# Patient Record
Sex: MALE | Race: WHITE
[De-identification: names, ages, dates, MRNs, and addresses within clinical notes are randomized per-mention and may not be internally consistent; named-entity substitution may affect disease eponyms.]

---

## 2019-01-16 VITALS — SYSTOLIC BLOOD PRESSURE: 137 MMHG | DIASTOLIC BLOOD PRESSURE: 73 MMHG

## 2019-01-16 LAB
BASOPHILS NFR BLD AUTO: 0.8 % (ref 0–5)
BUN SERPL-MCNC: 24 MG/DL (ref 7–18)
CHLORIDE SERPL-SCNC: 103 MMOL/L (ref 101–111)
CO2 SERPL-SCNC: 29 MMOL/L (ref 21–32)
CREAT SERPL-MCNC: 1.6 MG/DL (ref 0.5–1.5)
EOSINOPHIL NFR BLD AUTO: 1.7 % (ref 0–8)
ERYTHROCYTE [DISTWIDTH] IN BLOOD BY AUTOMATED COUNT: 15.2 % (ref 11–15.5)
GFR SERPL CREATININE-BSD FRML MDRD: 46 ML/MIN (ref 60–?)
GLUCOSE SERPL-MCNC: 113 MG/DL (ref 70–105)
HCT VFR BLD AUTO: 44.1 % (ref 42–54)
LYMPHOCYTES NFR SPEC AUTO: 23.9 % (ref 21–51)
MCH RBC QN AUTO: 30.6 PG (ref 27–33)
MCHC RBC AUTO-ENTMCNC: 32.9 G/DL (ref 32–36)
MCV RBC AUTO: 92.9 FL (ref 79–99)
MONOCYTES NFR BLD AUTO: 14.3 % (ref 3–13)
NEUTROPHILS NFR BLD AUTO: 59.3 % (ref 40–77)
NRBC BLD MANUAL-RTO: 0.1 % (ref 0–0.19)
PLATELET # BLD AUTO: 161 K/UL (ref 130–400)
POTASSIUM SERPL-SCNC: 3.1 MMOL/L (ref 3.5–5.1)
RBC # BLD AUTO: 4.75 MIL/UL (ref 4.5–6.2)
SODIUM SERPL-SCNC: 142 MMOL/L (ref 136–145)
WBC # BLD AUTO: 8.7 K/UL (ref 4.8–10.8)

## 2019-01-17 NOTE — NUR
ABNORMAL LABS 

REPORTED AND FAXED ABNORMAL LABS POTASSIUM 3.1- BMP TO DR. GRIFFIN OFFICE. SPOKE WITH 
LEIGH ANN. AS PER DR. APONTE NO NEW ORDERS.

## 2019-01-18 ENCOUNTER — HOSPITAL ENCOUNTER (OUTPATIENT)
Dept: HOSPITAL 90 - DAH | Age: 64
Discharge: HOME | End: 2019-01-18
Attending: UROLOGY
Payer: MEDICARE

## 2019-01-18 VITALS — SYSTOLIC BLOOD PRESSURE: 137 MMHG | DIASTOLIC BLOOD PRESSURE: 72 MMHG

## 2019-01-18 VITALS — DIASTOLIC BLOOD PRESSURE: 88 MMHG | SYSTOLIC BLOOD PRESSURE: 140 MMHG

## 2019-01-18 VITALS — DIASTOLIC BLOOD PRESSURE: 80 MMHG | SYSTOLIC BLOOD PRESSURE: 129 MMHG

## 2019-01-18 VITALS — SYSTOLIC BLOOD PRESSURE: 135 MMHG | DIASTOLIC BLOOD PRESSURE: 70 MMHG

## 2019-01-18 VITALS — DIASTOLIC BLOOD PRESSURE: 82 MMHG | SYSTOLIC BLOOD PRESSURE: 117 MMHG

## 2019-01-18 VITALS — SYSTOLIC BLOOD PRESSURE: 142 MMHG | DIASTOLIC BLOOD PRESSURE: 80 MMHG

## 2019-01-18 VITALS — SYSTOLIC BLOOD PRESSURE: 127 MMHG | DIASTOLIC BLOOD PRESSURE: 87 MMHG

## 2019-01-18 VITALS — DIASTOLIC BLOOD PRESSURE: 72 MMHG | SYSTOLIC BLOOD PRESSURE: 127 MMHG

## 2019-01-18 VITALS — SYSTOLIC BLOOD PRESSURE: 145 MMHG | DIASTOLIC BLOOD PRESSURE: 77 MMHG

## 2019-01-18 VITALS — DIASTOLIC BLOOD PRESSURE: 82 MMHG | SYSTOLIC BLOOD PRESSURE: 145 MMHG

## 2019-01-18 VITALS — SYSTOLIC BLOOD PRESSURE: 127 MMHG | DIASTOLIC BLOOD PRESSURE: 72 MMHG

## 2019-01-18 VITALS — BODY MASS INDEX: 44.24 KG/M2 | HEIGHT: 70 IN | WEIGHT: 309 LBS

## 2019-01-18 VITALS — SYSTOLIC BLOOD PRESSURE: 142 MMHG | DIASTOLIC BLOOD PRESSURE: 69 MMHG

## 2019-01-18 VITALS — DIASTOLIC BLOOD PRESSURE: 84 MMHG | SYSTOLIC BLOOD PRESSURE: 138 MMHG

## 2019-01-18 VITALS — DIASTOLIC BLOOD PRESSURE: 80 MMHG | SYSTOLIC BLOOD PRESSURE: 137 MMHG

## 2019-01-18 VITALS — SYSTOLIC BLOOD PRESSURE: 121 MMHG | DIASTOLIC BLOOD PRESSURE: 65 MMHG

## 2019-01-18 VITALS — SYSTOLIC BLOOD PRESSURE: 136 MMHG | DIASTOLIC BLOOD PRESSURE: 77 MMHG

## 2019-01-18 DIAGNOSIS — N21.0: ICD-10-CM

## 2019-01-18 DIAGNOSIS — G47.30: ICD-10-CM

## 2019-01-18 DIAGNOSIS — Z88.8: ICD-10-CM

## 2019-01-18 DIAGNOSIS — I10: ICD-10-CM

## 2019-01-18 DIAGNOSIS — Z79.899: ICD-10-CM

## 2019-01-18 DIAGNOSIS — Z98.890: ICD-10-CM

## 2019-01-18 DIAGNOSIS — N40.1: ICD-10-CM

## 2019-01-18 DIAGNOSIS — Z85.850: ICD-10-CM

## 2019-01-18 DIAGNOSIS — N13.2: Primary | ICD-10-CM

## 2019-01-18 PROCEDURE — 76000 FLUOROSCOPY <1 HR PHYS/QHP: CPT

## 2019-01-18 PROCEDURE — 36415 COLL VENOUS BLD VENIPUNCTURE: CPT

## 2019-01-18 PROCEDURE — 85025 COMPLETE CBC W/AUTO DIFF WBC: CPT

## 2019-01-18 PROCEDURE — 93005 ELECTROCARDIOGRAM TRACING: CPT

## 2019-01-18 PROCEDURE — 52317 REMOVE BLADDER STONE: CPT

## 2019-01-18 PROCEDURE — 52356 CYSTO/URETERO W/LITHOTRIPSY: CPT

## 2019-01-18 PROCEDURE — 80048 BASIC METABOLIC PNL TOTAL CA: CPT

## 2019-01-18 NOTE — NUR
DISCHARGE

ORAL AND WRITTEN DISCHARGE INSTRUCTIONS TO PT AND PTS WIFE ALONG WITH PRESCRIPTION. NO OTHER 
QUESTIONS AT THIS TIME.

## 2019-01-18 NOTE — NUR
VALUABLES:

CLOTHING, CANE, GLASSES, MEDICATIONS, WALLET AND CELL PHONE GIVEN TO WIFE: YAKELIN ARROYO. CPAP 
MACHINE SEND TO PACU

## 2019-01-18 NOTE — NUR
FC

16F FC DISCONTINUED USING ASEPTIC TECHNIQUE. 700ML OF RED CLEAR URINE IN FC. PT TOLERATED 
PROCEDURE WELL. INSTRUCTED PT NEEDING TO VOID PRIOR TO DISCHARGE.  PT AND WIFE VERBALIZED 
UNDERSTANDING.

## 2019-01-18 NOTE — NUR
ASSESSMENT

RECEIVED PT FROM PACU STAFF ALLYSSA CORONA. PT DOING WELL. 16F FC DRAINING TO BEDSIDE VIA GRAVITY. 
CLEAR, RED URINE IN BAG. WIFE AT BEDSIDE.

## 2019-01-19 ENCOUNTER — HOSPITAL ENCOUNTER (EMERGENCY)
Dept: HOSPITAL 90 - EDH | Age: 64
Discharge: HOME | End: 2019-01-19
Payer: MEDICARE

## 2019-01-19 DIAGNOSIS — R79.1: ICD-10-CM

## 2019-01-19 DIAGNOSIS — E78.5: ICD-10-CM

## 2019-01-19 DIAGNOSIS — Z87.891: ICD-10-CM

## 2019-01-19 DIAGNOSIS — Z88.6: ICD-10-CM

## 2019-01-19 DIAGNOSIS — R33.9: ICD-10-CM

## 2019-01-19 DIAGNOSIS — N39.0: Primary | ICD-10-CM

## 2019-01-19 DIAGNOSIS — I10: ICD-10-CM

## 2019-01-19 LAB
ALBUMIN SERPL-MCNC: 4 G/DL (ref 3.5–5)
ALT SERPL-CCNC: 156 U/L (ref 12–78)
APPEARANCE UR: CLEAR
APTT PPP: 22 SEC (ref 26.3–35.5)
AST SERPL-CCNC: 92 U/L (ref 10–37)
BASOPHILS NFR BLD AUTO: 0.6 % (ref 0–5)
BILIRUB SERPL-MCNC: 0.8 MG/DL (ref 0.2–1)
BILIRUB UR QL STRIP: NEGATIVE
BUN SERPL-MCNC: 19 MG/DL (ref 7–18)
CHLORIDE SERPL-SCNC: 99 MMOL/L (ref 101–111)
CO2 SERPL-SCNC: 31 MMOL/L (ref 21–32)
COLOR UR: (no result)
CREAT SERPL-MCNC: 1.3 MG/DL (ref 0.5–1.5)
EOSINOPHIL NFR BLD AUTO: 0.2 % (ref 0–8)
ERYTHROCYTE [DISTWIDTH] IN BLOOD BY AUTOMATED COUNT: 15.1 % (ref 11–15.5)
GFR SERPL CREATININE-BSD FRML MDRD: 59 ML/MIN (ref 60–?)
GLUCOSE SERPL-MCNC: 121 MG/DL (ref 70–105)
GLUCOSE UR STRIP-MCNC: 100 MG/DL
HCT VFR BLD AUTO: 44.3 % (ref 42–54)
HGB UR QL STRIP: (no result)
INR PPP: 1.07 (ref 0.85–1.15)
KETONES UR STRIP-MCNC: NEGATIVE MG/DL
LEUKOCYTE ESTERASE UR QL STRIP: (no result)
LYMPHOCYTES NFR SPEC AUTO: 14.9 % (ref 21–51)
MCH RBC QN AUTO: 31 PG (ref 27–33)
MCHC RBC AUTO-ENTMCNC: 33.1 G/DL (ref 32–36)
MCV RBC AUTO: 93.7 FL (ref 79–99)
MONOCYTES NFR BLD AUTO: 10.8 % (ref 3–13)
NEUTROPHILS NFR BLD AUTO: 73.5 % (ref 40–77)
NITRITE UR QL STRIP: POSITIVE
NRBC BLD MANUAL-RTO: 0 % (ref 0–0.19)
PH UR STRIP: 6.5 [PH] (ref 5–8)
PLATELET # BLD AUTO: 157 K/UL (ref 130–400)
POTASSIUM SERPL-SCNC: 3.2 MMOL/L (ref 3.5–5.1)
PROT SERPL-MCNC: 8.1 G/DL (ref 6–8.3)
PROT UR QL STRIP: 100
PROTHROMBIN TIME: 11.2 SEC (ref 9.6–11.6)
RBC # BLD AUTO: 4.73 MIL/UL (ref 4.5–6.2)
RBC #/AREA URNS HPF: (no result) /HPF (ref 0–1)
SODIUM SERPL-SCNC: 138 MMOL/L (ref 136–145)
SP GR UR STRIP: 1.01 (ref 1–1.03)
UROBILINOGEN UR STRIP-MCNC: 1 MG/DL (ref 0.2–1)
WBC # BLD AUTO: 12 K/UL (ref 4.8–10.8)
WBC #/AREA URNS HPF: (no result) /HPF (ref 0–1)

## 2019-01-19 PROCEDURE — 51702 INSERT TEMP BLADDER CATH: CPT

## 2019-01-19 PROCEDURE — 85730 THROMBOPLASTIN TIME PARTIAL: CPT

## 2019-01-19 PROCEDURE — 96374 THER/PROPH/DIAG INJ IV PUSH: CPT

## 2019-01-19 PROCEDURE — 96375 TX/PRO/DX INJ NEW DRUG ADDON: CPT

## 2019-01-19 PROCEDURE — 99284 EMERGENCY DEPT VISIT MOD MDM: CPT

## 2019-01-19 PROCEDURE — 85610 PROTHROMBIN TIME: CPT

## 2019-01-19 PROCEDURE — 80053 COMPREHEN METABOLIC PANEL: CPT

## 2019-01-19 PROCEDURE — 85025 COMPLETE CBC W/AUTO DIFF WBC: CPT

## 2019-01-19 PROCEDURE — 87088 URINE BACTERIA CULTURE: CPT

## 2019-01-19 PROCEDURE — 81001 URINALYSIS AUTO W/SCOPE: CPT

## 2019-01-19 PROCEDURE — 36415 COLL VENOUS BLD VENIPUNCTURE: CPT
